# Patient Record
Sex: MALE | Race: WHITE | NOT HISPANIC OR LATINO | Employment: OTHER | URBAN - METROPOLITAN AREA
[De-identification: names, ages, dates, MRNs, and addresses within clinical notes are randomized per-mention and may not be internally consistent; named-entity substitution may affect disease eponyms.]

---

## 2019-05-09 ENCOUNTER — HOSPITAL ENCOUNTER (OUTPATIENT)
Dept: GENERAL RADIOLOGY | Facility: HOSPITAL | Age: 64
Discharge: HOME | End: 2019-05-09
Attending: PHYSICIAN ASSISTANT

## 2019-05-10 ENCOUNTER — OFFICE VISIT CONVERTED (OUTPATIENT)
Dept: NEUROSURGERY | Facility: CLINIC | Age: 64
End: 2019-05-10
Attending: PHYSICIAN ASSISTANT

## 2019-06-06 ENCOUNTER — OFFICE VISIT CONVERTED (OUTPATIENT)
Dept: NEUROSURGERY | Facility: CLINIC | Age: 64
End: 2019-06-06
Attending: PHYSICIAN ASSISTANT

## 2019-07-03 ENCOUNTER — OFFICE VISIT CONVERTED (OUTPATIENT)
Dept: NEUROSURGERY | Facility: CLINIC | Age: 64
End: 2019-07-03
Attending: PHYSICIAN ASSISTANT

## 2019-08-05 ENCOUNTER — OFFICE VISIT CONVERTED (OUTPATIENT)
Dept: NEUROSURGERY | Facility: CLINIC | Age: 64
End: 2019-08-05
Attending: PHYSICIAN ASSISTANT

## 2019-09-03 ENCOUNTER — HOSPITAL ENCOUNTER (OUTPATIENT)
Dept: PHYSICAL THERAPY | Facility: CLINIC | Age: 64
Setting detail: RECURRING SERIES
Discharge: HOME | End: 2019-12-09
Attending: NEUROLOGICAL SURGERY

## 2021-05-15 VITALS
BODY MASS INDEX: 37.81 KG/M2 | DIASTOLIC BLOOD PRESSURE: 62 MMHG | HEIGHT: 70 IN | OXYGEN SATURATION: 97 % | WEIGHT: 264.12 LBS | HEART RATE: 66 BPM | SYSTOLIC BLOOD PRESSURE: 128 MMHG

## 2021-05-15 VITALS
BODY MASS INDEX: 37.43 KG/M2 | DIASTOLIC BLOOD PRESSURE: 82 MMHG | WEIGHT: 261.44 LBS | OXYGEN SATURATION: 97 % | SYSTOLIC BLOOD PRESSURE: 130 MMHG | HEIGHT: 70 IN | HEART RATE: 72 BPM

## 2021-05-15 VITALS — BODY MASS INDEX: 39.22 KG/M2 | HEART RATE: 74 BPM | HEIGHT: 70 IN | WEIGHT: 274 LBS

## 2021-05-15 VITALS — HEIGHT: 70 IN | BODY MASS INDEX: 38.22 KG/M2 | HEART RATE: 71 BPM | WEIGHT: 267 LBS

## 2021-12-13 ENCOUNTER — OFFICE VISIT (OUTPATIENT)
Dept: ORTHOPEDIC SURGERY | Facility: CLINIC | Age: 66
End: 2021-12-13

## 2021-12-13 VITALS — HEIGHT: 69 IN | BODY MASS INDEX: 40.73 KG/M2 | WEIGHT: 275 LBS

## 2021-12-13 DIAGNOSIS — M25.561 RIGHT KNEE PAIN, UNSPECIFIED CHRONICITY: Primary | ICD-10-CM

## 2021-12-13 DIAGNOSIS — M17.11 PRIMARY OSTEOARTHRITIS OF RIGHT KNEE: ICD-10-CM

## 2021-12-13 PROCEDURE — 20610 DRAIN/INJ JOINT/BURSA W/O US: CPT | Performed by: PHYSICIAN ASSISTANT

## 2021-12-13 PROCEDURE — 99203 OFFICE O/P NEW LOW 30 MIN: CPT | Performed by: PHYSICIAN ASSISTANT

## 2021-12-13 RX ORDER — VALSARTAN AND HYDROCHLOROTHIAZIDE 320; 25 MG/1; MG/1
1 TABLET, FILM COATED ORAL DAILY
COMMUNITY
Start: 2021-09-22

## 2021-12-13 RX ORDER — LIDOCAINE HYDROCHLORIDE 10 MG/ML
9 INJECTION, SOLUTION INFILTRATION; PERINEURAL
Status: COMPLETED | OUTPATIENT
Start: 2021-12-13 | End: 2021-12-13

## 2021-12-13 RX ORDER — TRIAMCINOLONE ACETONIDE 40 MG/ML
40 INJECTION, SUSPENSION INTRA-ARTICULAR; INTRAMUSCULAR
Status: COMPLETED | OUTPATIENT
Start: 2021-12-13 | End: 2021-12-13

## 2021-12-13 RX ADMIN — TRIAMCINOLONE ACETONIDE 40 MG: 40 INJECTION, SUSPENSION INTRA-ARTICULAR; INTRAMUSCULAR at 09:07

## 2021-12-13 RX ADMIN — LIDOCAINE HYDROCHLORIDE 9 ML: 10 INJECTION, SOLUTION INFILTRATION; PERINEURAL at 09:07

## 2021-12-13 NOTE — PROGRESS NOTES
"Chief Complaint  Pain of the Right Knee    Subjective          Steve Roa presents to [facility] for evaluation of right knee pain. Patient states that on Thanksgiving day his grandson had a toy under his recliner. He states when he got back up onto his feet, he felt a slight \"pinch\" and felt that he was unable to move his knee around. He states that he had swelling following this. He states he could not walk on his knee about a week later. He does report that his pain has improved over the past several days. He has tried taking Aleeve for his knee. He has history of right knee arthroscopy by Dr. Milton in 2013. He presents today using a cane for ambulation assistance, which he states he has been using since last Monday.     Objective   Allergies   Allergen Reactions   • Diclofenac Sodium Unknown - Low Severity     DOES NOT TOLERATE WELL       Vital Signs:   Ht 175.3 cm (69\")   Wt 125 kg (275 lb)   BMI 40.61 kg/m²       Physical Exam  Constitutional:       Appearance: Normal appearance. Patient is well-developed and normal weight.   HENT:      Head: Normocephalic.      Right Ear: Hearing and external ear normal.      Left Ear: Hearing and external ear normal.      Nose: Nose normal.   Eyes:      Conjunctiva/sclera: Conjunctivae normal.   Cardiovascular:      Rate and Rhythm: Normal rate.   Pulmonary:      Effort: Pulmonary effort is normal.      Breath sounds: No wheezing or rales.   Abdominal:      Palpations: Abdomen is soft.      Tenderness: There is no abdominal tenderness.   Musculoskeletal:      Cervical back: Normal range of motion.   Skin:     Findings: No rash.   Neurological:      Mental Status: Patient is alert and oriented to person, place, and time.   Psychiatric:         Mood and Affect: Mood and affect normal.         Judgment: Judgment normal.     Ortho Exam  Right knee: Moderate swelling.  Exquisite tenderness to palpate along medial joint line.  Mildly tender " lateral joint line.  Tender hamstring tendons.  No discoloration or deformity.  AROM is -3 to 105 degrees of flexion.  Painful knee flexion.  Stable to varus/valgus stress.  Good muscle tone of quadriceps, hamstrings and ankle flexors.  Calf is soft and nontender.  Sensation intact, neurovascular intact.  Posterior tibialis pulse 2+.    Result Review :   The following data was reviewed by: TRACY Irwin on 12/13/2021:         Imaging Results (Most Recent)     Procedure Component Value Units Date/Time    XR Knee 3 View Right [[id number]] Resulted: 12/13/21 0854     Updated: 12/13/21 0859    Narrative:      X-Ray Report:  Study: X-rays ordered, taken in the office, and reviewed today  Site: right knee Xray  Indication: right knee pain   View: Lateral, Standing and Sunrise view(s)  Findings: Moderate-advanced degenerative changes.   Prior studies available for comparison: no         Large Joint Arthrocentesis: R knee  Date/Time: 12/13/2021 9:07 AM  Consent given by: patient  Site marked: site marked  Timeout: Immediately prior to procedure a time out was called to verify the correct patient, procedure, equipment, support staff and site/side marked as required   Supporting Documentation  Indications: pain   Procedure Details  Location: knee - R knee  Medications administered: 9 mL lidocaine 1 %; 40 mg triamcinolone acetonide 40 MG/ML  Patient tolerance: patient tolerated the procedure well with no immediate complications            Assessment and Plan    Problem List Items Addressed This Visit        Musculoskeletal and Injuries    Right knee pain - Primary    Relevant Orders    XR Knee 3 View Right (Completed)    Primary osteoarthritis of right knee        Discussed diagnosis and treatment options with patient today in clinic.  We discussed anti-inflammatory medication, physical therapy and steroid injections.  Patient was given steroid injection today.  He will continue use of walking cane until symptoms improve.   We discussed in the future seeking approval for Synvisc injections.  Patient was informed that he is a candidate for right total knee arthroplasty, however he would like to continue with conservative management if possible.      Follow Up   Return in about 4 weeks (around 1/10/2022).  Patient Instructions   Recommend ice for any pain and swelling following today steroid injection.  Patient will follow up in 4 weeks to reevaluate improvement of symptoms.    Patient was given instructions and counseling regarding his condition or for health maintenance advice. Please see specific information pulled into the AVS if appropriate.

## 2021-12-13 NOTE — PATIENT INSTRUCTIONS
Recommend ice for any pain and swelling following today steroid injection.  Patient will follow up in 4 weeks to reevaluate improvement of symptoms.

## 2021-12-16 ENCOUNTER — PATIENT ROUNDING (BHMG ONLY) (OUTPATIENT)
Dept: ORTHOPEDIC SURGERY | Facility: CLINIC | Age: 66
End: 2021-12-16

## 2021-12-16 NOTE — PROGRESS NOTES
December 16, 2021    Hello, may I speak with Steve Roa?    My name is Merry      I am  with [facility]  [address]  [place] [address] [phone].    Before we get started may I verify your date of birth? 1955    I am calling to officially welcome you to our practice and ask about your recent visit. Is this a good time to talk? yes    Tell me about your visit with us. What things went well?  All of it went well, provider had good interaction with patient.        We're always looking for ways to make our patients' experiences even better. Do you have recommendations on ways we may improve?  no    Overall were you satisfied with your first visit to our practice? yes       I appreciate you taking the time to speak with me today. Is there anything else I can do for you? no      Thank you, and have a great day.

## 2022-01-10 ENCOUNTER — OFFICE VISIT (OUTPATIENT)
Dept: ORTHOPEDIC SURGERY | Facility: CLINIC | Age: 67
End: 2022-01-10

## 2022-01-10 VITALS — HEIGHT: 69 IN | BODY MASS INDEX: 39.99 KG/M2 | WEIGHT: 270 LBS

## 2022-01-10 DIAGNOSIS — M17.11 PRIMARY OSTEOARTHRITIS OF RIGHT KNEE: Primary | ICD-10-CM

## 2022-01-10 PROCEDURE — 99212 OFFICE O/P EST SF 10 MIN: CPT | Performed by: PHYSICIAN ASSISTANT

## 2022-01-10 NOTE — PATIENT INSTRUCTIONS
We will seek approval for visco. If he decides to not pursue Synvisc injection, he can repeat steroid injection 03/13/22.     Call or return with any changes or concerns.

## 2022-01-10 NOTE — PROGRESS NOTES
"Chief Complaint  Pain of the Right Knee    Subjective          Steve Roa presents to [facility] for follow-up of right knee pain, right knee osteoarthritis.  Patient presented to clinic approximately 1 month ago, at which time he was having pain of his medial aspect of knee.  He has history of right knee arthroscopy by Dr. Milton in 2013.  Patient opted to have steroid injection at previous visit.  He also has been using Aleve, which he states provides him more relief than Tylenol.  He states steroid injection helped with pain of his right knee.  He states he has no longer needed to use cane for ambulation assistance and presents fully weightbearing today.    Objective   Allergies   Allergen Reactions   • Diclofenac Sodium Unknown - Low Severity     DOES NOT TOLERATE WELL       Vital Signs:   Ht 175.3 cm (69\")   Wt 122 kg (270 lb)   BMI 39.87 kg/m²       Physical Exam  Constitutional:       Appearance: Normal appearance. Patient is well-developed and normal weight.   HENT:      Head: Normocephalic.      Right Ear: Hearing and external ear normal.      Left Ear: Hearing and external ear normal.      Nose: Nose normal.   Eyes:      Conjunctiva/sclera: Conjunctivae normal.   Cardiovascular:      Rate and Rhythm: Normal rate.   Pulmonary:      Effort: Pulmonary effort is normal.      Breath sounds: No wheezing or rales.   Abdominal:      Palpations: Abdomen is soft.      Tenderness: There is no abdominal tenderness.   Musculoskeletal:      Cervical back: Normal range of motion.   Skin:     Findings: No rash.   Neurological:      Mental Status: Patient is alert and oriented to person, place, and time.   Psychiatric:         Mood and Affect: Mood and affect normal.         Judgment: Judgment normal.     Ortho Exam  Right knee: Full knee flexion and extension.  Crepitus with knee extension.  Mildly tender over the medial joint line.  Normal swelling.  Stable to varus/valgus stress.  " Good muscle tone of the quadriceps, hamstrings and ankle flexors.  Full plantar and dorsiflexion of the ankle.  Sensation intact, neurovascular intact, posterior tibialis pulse 2+, calf is soft and nontender.  Fully weightbearing, gait nonantalgic.    Result Review :   The following data was reviewed by: TRACY Irwin on 01/10/2022:         Imaging Results (Most Recent)     None                Assessment and Plan    Problem List Items Addressed This Visit        Musculoskeletal and Injuries    Primary osteoarthritis of right knee - Primary        Discussed treatment options with patient in clinic.  Patient is candidate for right total knee arthroplasty.  Steroid injection help relieve knee pain.  We discussed trying Synvisc injection and we will seek approval of this.  If he decides not to pursue Synvisc injection he can repeat steroid injection in March.      Follow Up   Return for Recheck.  Patient Instructions   We will seek approval for visco. If he decides to not pursue Synvisc injection, he can repeat steroid injection 03/13/22.     Call or return with any changes or concerns.     Patient was given instructions and counseling regarding his condition or for health maintenance advice. Please see specific information pulled into the AVS if appropriate.

## 2022-01-19 ENCOUNTER — IMMUNIZATION (OUTPATIENT)
Dept: VACCINE CLINIC | Facility: HOSPITAL | Age: 67
End: 2022-01-19

## 2022-01-19 PROCEDURE — 91300 HC SARSCOV02 VAC 30MCG/0.3ML IM: CPT | Performed by: INTERNAL MEDICINE

## 2022-01-19 PROCEDURE — 0004A HC ADM SARSCOV2 30MCG/0.3ML BOOSTER: CPT | Performed by: INTERNAL MEDICINE

## 2022-02-09 ENCOUNTER — OFFICE VISIT (OUTPATIENT)
Dept: ORTHOPEDIC SURGERY | Facility: CLINIC | Age: 67
End: 2022-02-09

## 2022-02-09 VITALS — BODY MASS INDEX: 39.99 KG/M2 | HEART RATE: 72 BPM | WEIGHT: 270 LBS | OXYGEN SATURATION: 95 % | HEIGHT: 69 IN

## 2022-02-09 DIAGNOSIS — M17.11 PRIMARY OSTEOARTHRITIS OF RIGHT KNEE: Primary | ICD-10-CM

## 2022-02-09 PROCEDURE — 20610 DRAIN/INJ JOINT/BURSA W/O US: CPT | Performed by: PHYSICIAN ASSISTANT

## 2022-02-09 NOTE — PROGRESS NOTES
"Chief Complaint  Follow-up of the Right Knee    Subjective          Steve Roa presents to [facility] for follow up of right knee pain, right knee osteoarthritis. Patient has history of right knee arthroscopy by Dr. Milton in 2013. He has been having medial side knee pain over the past several months. He received steroid injection of his knee in December with good relief, but states shot did not help for as long as he would have liked. He is candidate for right TKA. He is here today to receive Synvisc injection of his knee. He wishes to purse conservative management of right knee osteoarthritis at this time.    Objective   Allergies   Allergen Reactions   • Diclofenac Sodium Unknown - Low Severity     DOES NOT TOLERATE WELL       Vital Signs:   Pulse 72   Ht 175.3 cm (69\")   Wt 122 kg (270 lb)   SpO2 95%   BMI 39.87 kg/m²       Physical Exam  Constitutional:       Appearance: Normal appearance. Patient is well-developed and normal weight.   HENT:      Head: Normocephalic.      Right Ear: Hearing and external ear normal.      Left Ear: Hearing and external ear normal.      Nose: Nose normal.   Eyes:      Conjunctiva/sclera: Conjunctivae normal.   Cardiovascular:      Rate and Rhythm: Normal rate.   Pulmonary:      Effort: Pulmonary effort is normal.      Breath sounds: No wheezing or rales.   Abdominal:      Palpations: Abdomen is soft.      Tenderness: There is no abdominal tenderness.   Musculoskeletal:      Cervical back: Normal range of motion.   Skin:     Findings: No rash.   Neurological:      Mental Status: Patient is alert and oriented to person, place, and time.   Psychiatric:         Mood and Affect: Mood and affect normal.         Judgment: Judgment normal.     Ortho Exam  RIGHT KNEE: tender medial joint line. Scars present. Trace effusion. Full knee extension, flexion to 115. Good strength to the quadriceps and hamstrings. Sensation intact. Neurovascular intact. Calf " soft, non-tender. Posterior tibialis pulse 2+.     Result Review :            Imaging Results (Most Recent)     None         Large Joint Arthrocentesis: R knee  Date/Time: 2/9/2022 9:25 AM  Consent given by: patient  Site marked: site marked  Timeout: Immediately prior to procedure a time out was called to verify the correct patient, procedure, equipment, support staff and site/side marked as required   Supporting Documentation  Indications: pain   Procedure Details  Location: knee - R knee  Needle gauge: 21g.  Medications administered: 48 mg hylan 48 MG/6ML  Patient tolerance: patient tolerated the procedure well with no immediate complications            Assessment and Plan    Problem List Items Addressed This Visit        Musculoskeletal and Injuries    Primary osteoarthritis of right knee - Primary          Follow Up   Return if symptoms worsen or fail to improve.  Patient Instructions   Right knee Synvisc injection given today. Recommend rest, ice and elevation of the right knee following today's injection.   Call or return with any changes or concerns.   Follow up as needed.    Patient was given instructions and counseling regarding his condition or for health maintenance advice. Please see specific information pulled into the AVS if appropriate.

## 2022-02-09 NOTE — PATIENT INSTRUCTIONS
Right knee Synvisc injection given today. Recommend rest, ice and elevation of the right knee following today's injection.   Call or return with any changes or concerns.   Follow up as needed.

## 2022-05-19 ENCOUNTER — TELEPHONE (OUTPATIENT)
Dept: ORTHOPEDIC SURGERY | Facility: CLINIC | Age: 67
End: 2022-05-19

## 2022-05-19 NOTE — TELEPHONE ENCOUNTER
Caller: CHRISTIANO HINES    Relationship to patient: SELF    Best call back number: [phone]    Chief complaint: RT KNEE    Type of visit: INJECTION/FOLLOW UP    Requested date: PT WOULD LIKE TO GET BEFORE NEXT WEEKEND IF POSSIBLE.    If rescheduling, when is the original appointment: NA    Additional notes: WOULD PREFER GEL INJECTION. IF IT IS TOO SOON FOR THAT WOULD LIKE TO HAVE STEROID INJECTION.

## 2022-05-20 ENCOUNTER — OFFICE VISIT (OUTPATIENT)
Dept: ORTHOPEDIC SURGERY | Facility: CLINIC | Age: 67
End: 2022-05-20

## 2022-05-20 VITALS — HEIGHT: 69 IN | WEIGHT: 265 LBS | BODY MASS INDEX: 39.25 KG/M2

## 2022-05-20 DIAGNOSIS — M17.11 PRIMARY OSTEOARTHRITIS OF RIGHT KNEE: Primary | ICD-10-CM

## 2022-05-20 PROCEDURE — 20610 DRAIN/INJ JOINT/BURSA W/O US: CPT | Performed by: PHYSICIAN ASSISTANT

## 2022-05-20 RX ORDER — LIDOCAINE HYDROCHLORIDE 10 MG/ML
5 INJECTION, SOLUTION INFILTRATION; PERINEURAL
Status: COMPLETED | OUTPATIENT
Start: 2022-05-20 | End: 2022-05-20

## 2022-05-20 RX ORDER — TRIAMCINOLONE ACETONIDE 40 MG/ML
40 INJECTION, SUSPENSION INTRA-ARTICULAR; INTRAMUSCULAR
Status: COMPLETED | OUTPATIENT
Start: 2022-05-20 | End: 2022-05-20

## 2022-05-20 RX ADMIN — TRIAMCINOLONE ACETONIDE 40 MG: 40 INJECTION, SUSPENSION INTRA-ARTICULAR; INTRAMUSCULAR at 08:45

## 2022-05-20 RX ADMIN — LIDOCAINE HYDROCHLORIDE 5 ML: 10 INJECTION, SOLUTION INFILTRATION; PERINEURAL at 08:45

## 2022-05-20 NOTE — PROGRESS NOTES
"Chief Complaint  Follow-up of the Right Knee    Subjective          Steve Roa presents to [facility] for follow-up of right knee pain, right knee osteoarthritis.  Patient had Synvisc injection of his knee on 02/09/2022 and reports great relief following this.  He states as of yesterday, he began having knee pain once again.  He does have history of right knee scope by Dr. Milton in 2013.  He is also candidate for right total knee arthroplasty, but wishes to continue conservative care as it is working for him.  He is requesting steroid injection today.    Objective   Allergies   Allergen Reactions   • Diclofenac Sodium Unknown - Low Severity     DOES NOT TOLERATE WELL       Vital Signs:   Ht 175.3 cm (69\")   Wt 120 kg (265 lb)   BMI 39.13 kg/m²       Physical Exam  Constitutional:       Appearance: Normal appearance. Patient is well-developed and normal weight.   HENT:      Head: Normocephalic.      Right Ear: Hearing and external ear normal.      Left Ear: Hearing and external ear normal.      Nose: Nose normal.   Eyes:      Conjunctiva/sclera: Conjunctivae normal.   Cardiovascular:      Rate and Rhythm: Normal rate.   Pulmonary:      Effort: Pulmonary effort is normal.      Breath sounds: No wheezing or rales.   Abdominal:      Palpations: Abdomen is soft.      Tenderness: There is no abdominal tenderness.   Musculoskeletal:      Cervical back: Normal range of motion.   Skin:     Findings: No rash.   Neurological:      Mental Status: Patient is alert and oriented to person, place, and time.   Psychiatric:         Mood and Affect: Mood and affect normal.         Judgment: Judgment normal.     Ortho Exam  Right knee: Scars present and well-healed.  Tenderness to the medial joint line.  Mild to moderate swelling.  AROM is 0 to 115 degrees of flexion.  Stable to varus/valgus stress.  Full plantarflexion and dorsiflexion of the ankle.  Over the toes.  Full weightbearing, nonantalgic " gait.  Sensation and pulses are intact.  Neurovascular intact distally.    Result Review :   The following data was reviewed by: TRACY Irwin on 05/20/2022:         Imaging Results (Most Recent)     None         Large Joint Arthrocentesis: R knee  Date/Time: 5/20/2022 8:45 AM  Consent given by: patient  Site marked: site marked  Timeout: Immediately prior to procedure a time out was called to verify the correct patient, procedure, equipment, support staff and site/side marked as required   Supporting Documentation  Indications: pain   Procedure Details  Location: knee - R knee  Needle gauge: 21g.  Medications administered: 5 mL lidocaine 1 %; 40 mg triamcinolone acetonide 40 MG/ML  Patient tolerance: patient tolerated the procedure well with no immediate complications            Assessment and Plan    Problem List Items Addressed This Visit        Musculoskeletal and Injuries    Primary osteoarthritis of right knee - Primary          Follow Up   No follow-ups on file.  Patient Instructions   Patient received steroid injection of the right knee today. He had great relief from Synvisc injection in February. Discussed repeating this in August.     Follow up PRN.     Patient was given instructions and counseling regarding his condition or for health maintenance advice. Please see specific information pulled into the AVS if appropriate.

## 2022-05-20 NOTE — PATIENT INSTRUCTIONS
Patient received steroid injection of the right knee today. He had great relief from Synvisc injection in February. Discussed repeating this in August.     Follow up PRN.

## 2022-08-16 ENCOUNTER — TRANSCRIBE ORDERS (OUTPATIENT)
Dept: ADMINISTRATIVE | Facility: HOSPITAL | Age: 67
End: 2022-08-16

## 2022-08-16 DIAGNOSIS — M54.50 LOW BACK PAIN, UNSPECIFIED BACK PAIN LATERALITY, UNSPECIFIED CHRONICITY, UNSPECIFIED WHETHER SCIATICA PRESENT: Primary | ICD-10-CM

## 2022-08-25 ENCOUNTER — HOSPITAL ENCOUNTER (OUTPATIENT)
Dept: MRI IMAGING | Facility: HOSPITAL | Age: 67
Discharge: HOME | End: 2022-08-25
Admitting: PHYSICIAN ASSISTANT

## 2022-08-25 DIAGNOSIS — M54.50 LOW BACK PAIN, UNSPECIFIED BACK PAIN LATERALITY, UNSPECIFIED CHRONICITY, UNSPECIFIED WHETHER SCIATICA PRESENT: ICD-10-CM

## 2022-08-25 PROCEDURE — 72148 MRI LUMBAR SPINE W/O DYE: CPT

## 2022-09-28 ENCOUNTER — OFFICE VISIT (OUTPATIENT)
Dept: NEUROSURGERY | Facility: CLINIC | Age: 67
End: 2022-09-28

## 2022-09-28 VITALS
WEIGHT: 287.3 LBS | BODY MASS INDEX: 42.55 KG/M2 | DIASTOLIC BLOOD PRESSURE: 76 MMHG | SYSTOLIC BLOOD PRESSURE: 128 MMHG | HEIGHT: 69 IN

## 2022-09-28 DIAGNOSIS — R20.2 PARESTHESIAS: ICD-10-CM

## 2022-09-28 DIAGNOSIS — M48.061 SPINAL STENOSIS, LUMBAR REGION, WITHOUT NEUROGENIC CLAUDICATION: ICD-10-CM

## 2022-09-28 DIAGNOSIS — M47.817 SPONDYLOSIS OF LUMBOSACRAL REGION WITHOUT MYELOPATHY OR RADICULOPATHY: Primary | ICD-10-CM

## 2022-09-28 DIAGNOSIS — M43.10 PARS DEFECT WITH SPONDYLOLISTHESIS: ICD-10-CM

## 2022-09-28 DIAGNOSIS — S32.041S CLOSED STABLE BURST FRACTURE OF FOURTH LUMBAR VERTEBRA, SEQUELA: ICD-10-CM

## 2022-09-28 DIAGNOSIS — M43.00 PARS DEFECT WITH SPONDYLOLISTHESIS: ICD-10-CM

## 2022-09-28 PROCEDURE — 99204 OFFICE O/P NEW MOD 45 MIN: CPT | Performed by: NURSE PRACTITIONER

## 2022-09-28 NOTE — PROGRESS NOTES
"Chief Complaint  Back Pain    Subjective          Steve Roa who is a 66 y.o. year old male who presents to [facility] for evaluation of lumbar spine.      Pt with history of L4 burst fracture three ago following a 14 foot fall of a barn roof. He has had back pain since that time which is worsening. The patient complains of pain located in the lumbar spine.  Patients states the pain has been present for 1 year.  The pain came on gradually.  The pain scale level is 2. Will increase to severe with activity.  Pain is primarily in the low back. The pain does not radiate. .  The pain is waxing/waning and described as dull and aching.  The pain is worse at no particular time of day. Patient states prolonged standing, prolonged walking, bending and twisting makes the pain worse.  Patient states rest makes the pain better.    Associated Symptoms Include: Numbness and Tingling. He has constant numbness and tingling in both feet, the whole foot to the ankle. He states that his toes feel like ice all the time.   Conservative Interventions Include: NSAIDs that were somewhat effective. He will take Aleve. He was prescribed Diclofenac though unsure this provides significant benefit. Tylenol did not help. He has not had any type of physical therapy. He has never seen pain management.     Was this the result of an injury or accident?: No    History of Previous Spinal Surgery?: No    Nicotine use: non-smoker    BMI: Body mass index is 42.43 kg/m².      Review of Systems   Musculoskeletal: Positive for arthralgias, back pain and myalgias.   Neurological: Positive for numbness.   All other systems reviewed and are negative.       Objective   Vital Signs:   /76 (BP Location: Left arm, Patient Position: Sitting)   Ht 175.3 cm (69\")   Wt 130 kg (287 lb 4.8 oz)   BMI 42.43 kg/m²       Physical Exam  Vitals reviewed.   Constitutional:       Appearance: Normal appearance. "   Musculoskeletal:      Lumbar back: Tenderness present. Negative right straight leg raise test and negative left straight leg raise test.      Right hip: No tenderness. Normal range of motion.      Left hip: No tenderness. Normal range of motion.   Neurological:      Mental Status: He is alert and oriented to person, place, and time.      Gait: Gait is intact.      Deep Tendon Reflexes: Strength normal.      Reflex Scores:       Patellar reflexes are 0 on the right side and 0 on the left side.       Achilles reflexes are 0 on the right side and 0 on the left side.       Neurologic Exam     Mental Status   Oriented to person, place, and time.   Level of consciousness: alert    Motor Exam   Muscle bulk: normal  Overall muscle tone: normal    Strength   Strength 5/5 throughout.     Sensory Exam   Right leg light touch: decreased from ankle  Left leg light touch: decreased from ankle    Gait, Coordination, and Reflexes     Gait  Gait: normal    Reflexes   Right patellar: 0  Left patellar: 0  Right achilles: 0  Left achilles: 0  Right ankle clonus: absent  Left ankle clonus: absent       Result Review :       Data reviewed: Radiologic studies MRI Lumbar Spine on 8/25/22 at [facility] personally reviewed. Old healed L4 compression fracture. Stable grade 1 anterolisthesis of L5 on S1 secondary to facet arthritis and probable chronic left sided pars defect. Varying degrees of canal or foraminal stenosis. At L3/4 there is moderately severe central canal stenosis. At L4/5 there is moderate canal stenosis and moderately severe right greater than left foraminal narrowing. Findings are similar to prior imaging.            Assessment and Plan    Diagnoses and all orders for this visit:    1. Spondylosis of lumbosacral region without myelopathy or radiculopathy (Primary)  -     Ambulatory Referral to Physical Therapy Evaluate and treat; Heat; Moist heat; Cross Fiber; Stretching (Traction), ROM, Strengthening  -     Ambulatory Referral  to Pain Management    2. Closed stable burst fracture of fourth lumbar vertebra, sequela  -     Ambulatory Referral to Physical Therapy Evaluate and treat; Heat; Moist heat; Cross Fiber; Stretching (Traction), ROM, Strengthening  -     Ambulatory Referral to Pain Management    3. Pars defect with spondylolisthesis    4. Spinal stenosis, lumbar region, without neurogenic claudication    5. Paresthesias  -     EMG & Nerve Conduction Test; Future    Pt with history of L4 burst fracture, presenting for evaluation of worsening low back pain. We reviewed his MRI Lumbar Spine, demonstrating multilevel spondylosis with chronic burst fracture at L4, resulting in varying levels of canal and foraminal stenosis. His pain is primarily axial. We discussed that surgery would not improve his pain.     We discussed the importance of core strengthening, avoidance of activities that worsen the pain, nicotine cessation and maintenance of healthy weight. Surgery for patients with multilevel degenerative disc disease is not typically successful with the risks outweighing the benefit.     Will refer to physical therapy for traction, E-stim, massage, range of motion. Refer to pain management to discuss lumbar facet joint injections and rhizotomy.     He is having numbness and paresthesias in the feet, in a symmetric distribution. Suspect a possible peripheral polyneuropathy. Will proceed with EMG/NCV for evaluation.     He will follow up as needed.     I spent 45 minutes caring for Steve on this date of service. This time includes time spent by me in the following activities:preparing for the visit, reviewing tests, obtaining and/or reviewing a separately obtained history, performing a medically appropriate examination and/or evaluation , counseling and educating the patient/family/caregiver, ordering medications, tests, or procedures, referring and communicating with other health care professionals , documenting information in the  medical record and independently interpreting results and communicating that information with the patient/family/caregiver.    Follow Up   Return if symptoms worsen or fail to improve.  Patient was given instructions and counseling regarding his condition or for health maintenance advice.     -Referral to pain management for facet joint injections  -Physical therapy for traction, strengthening, range of motion  -EMG/NCV  -Follow up as needed

## 2022-09-28 NOTE — PATIENT INSTRUCTIONS
-Referral to pain management for facet joint injections  -Physical therapy for traction, strengthening, range of motion  -EMG/NCV of the lower extremities  -Follow up as needed

## 2022-10-10 ENCOUNTER — TREATMENT (OUTPATIENT)
Dept: PHYSICAL THERAPY | Facility: CLINIC | Age: 67
End: 2022-10-10

## 2022-10-10 DIAGNOSIS — M25.60 STIFFNESS IN JOINT: ICD-10-CM

## 2022-10-10 DIAGNOSIS — S32.041D: ICD-10-CM

## 2022-10-10 DIAGNOSIS — M47.816 SPONDYLOSIS OF LUMBAR REGION WITHOUT MYELOPATHY OR RADICULOPATHY: Primary | ICD-10-CM

## 2022-10-10 DIAGNOSIS — M43.17 SPONDYLOLISTHESIS AT L5-S1 LEVEL: ICD-10-CM

## 2022-10-10 PROCEDURE — 97161 PT EVAL LOW COMPLEX 20 MIN: CPT | Performed by: PHYSICAL THERAPIST

## 2022-10-10 PROCEDURE — 97110 THERAPEUTIC EXERCISES: CPT | Performed by: PHYSICAL THERAPIST

## 2022-10-10 NOTE — PROGRESS NOTES
Physical Therapy Initial Evaluation and Plan of Care    Patient: Steve Roa   : 1955  Diagnosis/ICD-10 Code:  Spondylosis of lumbar region without myelopathy or radiculopathy [M47.816]  Referring practitioner: Latesha Ivory*  Date of Initial Visit: 10/10/2022  Today's Date: 10/10/2022  Patient seen for 1 sessions           Subjective Questionnaire: Oswestry: 16/50 = 32% Disability      Subjective Evaluation    History of Present Illness  Mechanism of injury: The patient presents to physical therapy with complaints of low back pain and bilateral foot numbness (left worse than right). He has experienced pain intermittently since he fell 14 feet from the roof of a barn in 2019 and sustained a stable L4 burst fracture. His back has hurt intermittently since that fall. His pain has gradually worsened over time, specifically the past few months. His pain is located primarily across his low back. His pain is increased with static standing, pushing a lawnmower, and doing the dishes. His pain is improved with laying down, sitting, and using Aspirin/Aleve. The numbness in his feet is nearly constant.     MRI Results:  1. Stable old healed L4 compression fracture.   2. Stable grade 1 anterior spondylolisthesis of L5 on S1 secondary to a combination of facet   arthritis and suggestion of a chronic left-sided pars defect.   3. Degenerative changes.   4. Varying degrees of canal stenosis and neural foraminal narrowing similar to the outside study.       Patient Occupation: Retired Pain  Current pain rating: 3 (Low back, pain is 5-6/10 a little higher on the right)  At best pain ratin  At worst pain ratin    Diagnostic Tests  MRI studies: abnormal    Patient Goals  Patient goal: The patient would like to have less pain be able to return to some light  activity.           Objective          Tenderness     Additional Tenderness Details  Tenderness to palpation in right sided lumbar  paraspinals and illiopsoas    Neurological Testing     Additional Neurological Details  Sensation to light touch intact and equal bilaterally from L2-S1 except for hyposensation on left in L4 and L5 dermatome.    Seated slump: (+) on left for increased tension in sciatic nerve.    Supine passive SLR: (-) bilaterally for increased sciatic neural tension.    Active Range of Motion     Lumbar   Flexion: 65 degrees   Extension: 5 degrees with pain  Left lateral flexion: Active left lumbar lateral flexion: mid thigh, pain on right. with pain  Right lateral flexion: Active right lumbar lateral flexion: 2'' above knee joint line.   Left rotation: Active left lumbar rotation: 50%, pain on right. with pain  Right rotation: Active right lumbar rotation: 75%     Strength/Myotome Testing     Left Hip   Planes of Motion   Flexion: 4-  Extension: 4-  Abduction: 4    Right Hip   Planes of Motion   Flexion: 4  Extension: 4-  Abduction: 4-    Left Knee   Flexion: 4  Extension: 5    Right Knee   Flexion: 4+  Extension: 5    Left Ankle/Foot   Dorsiflexion: 5    Right Ankle/Foot   Dorsiflexion: 5    Ambulation     Ambulation: Level Surfaces     Additional Level Surfaces Ambulation Details  The patient ambulates with equal step length bilaterally, but with minimal trunk movement and slight right lateral shift of trunk.      See Exercise, Manual, and Modality Logs for complete treatment.     Assessment & Plan     Assessment  Impairments: abnormal gait, abnormal muscle firing, abnormal muscle tone, abnormal or restricted ROM, activity intolerance, impaired balance, impaired physical strength, lacks appropriate home exercise program, pain with function and safety issue  Functional Limitations: carrying objects, lifting, sleeping, walking, pulling, pushing, uncomfortable because of pain, moving in bed, sitting, standing, stooping and unable to perform repetitive tasks  Assessment details: The patient presents to physical therapy with  complaints of low back pain with associated bilateral feet numbness. He has a history of a stable burst fracture at L4 and recent MRI which revealed a grade I spondylolisthesis of L5-S1. He presents with associated lumbar stiffness, tenderness to palpation, lower extremity weakness, altered sensation, and functional deficits (MALLORY). He would benefit from skilled physical therapy as described below to address these limitations and allow the patient to return to his prior level of function.  Prognosis: good    Goals  Plan Goals: LOW BACK PROBLEMS:    1. The patient complains of low back pain.  LTG 1: 12 weeks:  The patient will report a pain rating of 1/10 or better in order to improve  tolerance to activities of daily living and improve sleep quality.  STATUS:  New  STG 1a: 6 weeks:  The patient will report a pain rating of 2/10 or better.  STATUS:  New  TREATMENT:  Therapeutic exercises, manual therapy, aquatic therapy, home exercise   instruction, and modalities as needed for pain to include:  electrical stimulation, moist heat, ice,   ultrasound, and diathermy.      2. The patient demonstrates weakness of the bilateral hips.  LTG 2: 12 weeks:  The patient will demonstrate 4+ /5 strength for bilateral hip flexion, abduction,  and extension in order to improve hip stability.  STATUS:  New  STG 2a: 6 weeks:  The patient will demonstrate 4 /5 strength for bilateral hip flexion, abduction,  and extension.  STATUS:  New  TREATMENT: Therapeutic exercises, manual therapy, aquatic therapy, home exercise instruction,  and modalities as needed for pain to include:  electrical stimulation, moist heat, ice, ultrasound, and   diathermy.      3. The patient has gait dysfunction.  LTG 3: 12 weeks:  The patient will ambulate without assistive device, independently, for   community distances with normal biomechanics in order to improve mobility and allow   patient to perform activities such as grocery shopping with greater  ease.  STATUS:  New  TREATMENT: Gait training, aquatic therapy, therapeutic exercise, and home exercise instruction.    4. The patient has limited lumbar AROM  LTG 4: 12 weeks:  The patient will demonstrate lumbar AROM as follows: 70 of flexion and 10 degrees of extension.  STATUS:  New  TREATMENT: Manual therapy, therapeutic exercise, home exercise instruction, and modalities as needed to include: moist heat, electrical stimulation, and ultrasound.      5. Mobility: Walking/Moving Around Functional Limitation    LTG 5: 12 weeks:  The patient will demonstrate 10 % limitation by achieving a score of 5/50 on the MALLORY.  STATUS:  New  STG 5 a: 6 weeks:  The patient will demonstrate 20 % limitation by achieving a score of 10/50 on the MALLORY.    STATUS:  New  TREATMENT:  Manual therapy, therapeutic exercise, home exercise instruction, and modalities as needed to include: moist heat, electrical stimulation, and ultrasound.    Plan  Therapy options: will be seen for skilled therapy services  Planned modality interventions: cryotherapy, electrical stimulation/Russian stimulation, TENS, dry needling and traction  Planned therapy interventions: balance/weight-bearing training, ADL retraining, soft tissue mobilization, strengthening, stretching, therapeutic activities, joint mobilization, home exercise program, functional ROM exercises, flexibility, body mechanics training, postural training, neuromuscular re-education, manual therapy, abdominal trunk stabilization, IADL retraining and spinal/joint mobilization  Frequency: 3x week  Duration in weeks: 12  Treatment plan discussed with: patient        Visit Diagnoses:    ICD-10-CM ICD-9-CM   1. Spondylosis of lumbar region without myelopathy or radiculopathy  M47.816 721.3   2. Stable burst fracture of fourth lumbar vertebra, subsequent encounter for fracture with routine healing  S32.041D V54.17   3. Stiffness in joint  M25.60 719.50       History # of Personal Factors and/or  Comorbidities: LOW (0)  Examination of Body System(s): # of elements: LOW (1-2)  Clinical Presentation: STABLE   Clinical Decision Making: LOW       Timed:         Manual Therapy:    0     mins  68404;     Therapeutic Exercise:    10     mins  69237;     Neuromuscular Magno:    0    mins  91783;    Therapeutic Activity:     0     mins  73816;     Gait Trainin     mins  77212;     Ultrasound:     0     mins  42308;    Ionto                               0    mins   91529  Self Care                       0     mins   40972  Canalith Repos    0     mins 17677      Un-Timed:  Electrical Stimulation:    0     mins  83460 ( );  Dry Needling     0     mins self-pay  Traction     0     mins 99973  Low Eval     30     Mins  49845  Mod Eval     0     Mins  84949  High Eval                       0     Mins  55576  Re-Eval                           0    mins  11197    Timed Treatment:   10   mins   Total Treatment:     40   mins    PT SIGNATURE: Elkin France PT    Electronically signed 10/10/2022    [place] License: PT - [id number]      Initial Certification  Certification Period: 10/10/2022 thru 2023  I certify that the therapy services are furnished while this patient is under my care.  The services outlined above are required by this patient, and will be reviewed every 90 days.     PHYSICIAN: Latesha Abbott APRN  NPI: [id number]      DATE:     Please sign and return via fax to [phone]. Thank you, [facility].

## 2022-10-12 ENCOUNTER — TREATMENT (OUTPATIENT)
Dept: PHYSICAL THERAPY | Facility: CLINIC | Age: 67
End: 2022-10-12

## 2022-10-12 DIAGNOSIS — M25.60 STIFFNESS IN JOINT: ICD-10-CM

## 2022-10-12 DIAGNOSIS — M43.17 SPONDYLOLISTHESIS AT L5-S1 LEVEL: ICD-10-CM

## 2022-10-12 DIAGNOSIS — S32.041D: ICD-10-CM

## 2022-10-12 DIAGNOSIS — M47.816 SPONDYLOSIS OF LUMBAR REGION WITHOUT MYELOPATHY OR RADICULOPATHY: Primary | ICD-10-CM

## 2022-10-12 PROCEDURE — 97110 THERAPEUTIC EXERCISES: CPT | Performed by: PHYSICAL THERAPIST

## 2022-10-12 PROCEDURE — 97530 THERAPEUTIC ACTIVITIES: CPT | Performed by: PHYSICAL THERAPIST

## 2022-10-12 NOTE — PROGRESS NOTES
Physical Therapy Daily Treatment Note    Patient: Steve Roa   : 1955  Diagnosis/ICD-10 Code:  Spondylosis of lumbar region without myelopathy or radiculopathy [M47.816]  Referring practitioner: Latesha Ivory*  Date of Initial Visit: Type: THERAPY  Noted: 10/10/2022  Today's Date: 10/12/2022  Patient seen for 2 sessions           Subjective   The patient reported that the pain in his upper back is a 3-4/10. His lower back pain is a 3/10 today. His HEP is going okay.    Objective   See Exercise, Manual, and Modality Logs for complete treatment.     Assessment/Plan  The patient demonstrated good tolerance to all functional hip/core strengthening exercise today. Continue to progress per patient tolerance.       Timed:  Manual Therapy:    0     mins  03826;  Therapeutic Exercise:    30     mins  53645;     Neuromuscular Magno:   0    mins  79373;    Therapeutic Activity:     8     mins  89754;     Gait Trainin     mins  61645;     Aquatics                         0      mins  61083    Un-timed:  Mechanical Traction      0     mins  46734  Dry Needling     0     mins self-pay  Electrical Stimulation:    0     mins  76745 ( );      Timed Treatment:   38   mins   Total Treatment:     38   mins    Elkin France PT  Physical Therapist    Electronically signed 10/12/2022    [place] License: PT - [id number]

## 2022-10-17 ENCOUNTER — TREATMENT (OUTPATIENT)
Dept: PHYSICAL THERAPY | Facility: CLINIC | Age: 67
End: 2022-10-17

## 2022-10-17 DIAGNOSIS — M43.17 SPONDYLOLISTHESIS AT L5-S1 LEVEL: ICD-10-CM

## 2022-10-17 DIAGNOSIS — S32.041D: ICD-10-CM

## 2022-10-17 DIAGNOSIS — M25.60 STIFFNESS IN JOINT: ICD-10-CM

## 2022-10-17 DIAGNOSIS — M47.816 SPONDYLOSIS OF LUMBAR REGION WITHOUT MYELOPATHY OR RADICULOPATHY: Primary | ICD-10-CM

## 2022-10-17 PROCEDURE — 97530 THERAPEUTIC ACTIVITIES: CPT | Performed by: PHYSICAL THERAPIST

## 2022-10-17 PROCEDURE — 97110 THERAPEUTIC EXERCISES: CPT | Performed by: PHYSICAL THERAPIST

## 2022-10-17 NOTE — PROGRESS NOTES
Physical Therapy Daily Treatment Note                      [place] [facility] [address] [facility] [place] [address]    Patient: Steve Roa   : 1955  Diagnosis/ICD-10 Code:  Spondylosis of lumbar region without myelopathy or radiculopathy [M47.816]  Referring practitioner: Latesha Ivory*  Date of Initial Visit: Type: THERAPY  Noted: 10/10/2022  Today's Date: 10/17/2022  Patient seen for 3 sessions           Subjective   The patient reported that he was sore after his last visit. Most of the pain was in his upper back.    Objective   See Exercise, Manual, and Modality Logs for complete treatment.     Assessment/Plan  The patient demonstrated good tolerance to all PT interventions today. Continue to progress hip/core strengthening exercise per patient tolerance.       Timed:  Manual Therapy:    0     mins  70331;  Therapeutic Exercise:    30     mins  60528;     Neuromuscular Magno:   0    mins  72383;    Therapeutic Activity:     8     mins  68546;     Gait Trainin     mins  23139;     Aquatics                         0      mins  04029    Un-timed:  Mechanical Traction      0     mins  76398  Dry Needling     0     mins self-pay  Electrical Stimulation:    0     mins  68920 ( );      Timed Treatment:   38   mins   Total Treatment:     38   mins    Elkin France PT  Physical Therapist    Electronically signed 10/17/2022    [place] License: PT - [id number]

## 2022-10-20 ENCOUNTER — TREATMENT (OUTPATIENT)
Dept: PHYSICAL THERAPY | Facility: CLINIC | Age: 67
End: 2022-10-20

## 2022-10-20 DIAGNOSIS — M43.17 SPONDYLOLISTHESIS AT L5-S1 LEVEL: ICD-10-CM

## 2022-10-20 DIAGNOSIS — S32.041D: ICD-10-CM

## 2022-10-20 DIAGNOSIS — M25.60 STIFFNESS IN JOINT: ICD-10-CM

## 2022-10-20 DIAGNOSIS — M47.816 SPONDYLOSIS OF LUMBAR REGION WITHOUT MYELOPATHY OR RADICULOPATHY: Primary | ICD-10-CM

## 2022-10-20 PROCEDURE — 97530 THERAPEUTIC ACTIVITIES: CPT | Performed by: PHYSICAL THERAPIST

## 2022-10-20 PROCEDURE — 97110 THERAPEUTIC EXERCISES: CPT | Performed by: PHYSICAL THERAPIST

## 2022-10-20 NOTE — PROGRESS NOTES
"Physical Therapy Daily Treatment Note  [place] [address] [place] [address]    Patient: Steve Roa   : 1955  Referring practitioner: Latesha Ivory*  Date of Initial Visit: Type: THERAPY  Noted: 10/10/2022  Today's Date: 10/20/2022  Patient seen for 4 sessions           Subjective  Steve Roa reports: pain rating 2/10 at arrival that \"ramped up to 3/10\" with side stepping.       Objective   See Exercise, Manual, and Modality Logs for complete treatment.       Assessment/Plan  Marcelo mitchell advancement in exercises today. Educated in postural awareness. Therapist directed program required to further address deficits outlined in evaluation.      Visit Diagnoses:    ICD-10-CM ICD-9-CM   1. Spondylosis of lumbar region without myelopathy or radiculopathy  M47.816 721.3   2. Stable burst fracture of fourth lumbar vertebra, subsequent encounter for fracture with routine healing  S32.041D V54.17   3. Spondylolisthesis at L5-S1 level  M43.17 756.12   4. Stiffness in joint  M25.60 719.50       Progress per Plan of Care and Progress strengthening /stabilization /functional activity           Timed:  Manual Therapy:         mins  70535;  Therapeutic Exercise:    18     mins  05983;     Neuromuscular Magno:        mins  81342;    Therapeutic Activity:     12     mins  32843;     Gait Training:           mins  69129;     Ultrasound:          mins  99389;    Electrical Stimulation:         mins  18451 ( );  Aquatics  __   mins   54629    Untimed:  Electrical Stimulation:         mins  08066 ( );  Mechanical Traction:         mins  03602;     Timed Treatment:   30   mins   Total Treatment:     30   mins    Electronically Signed:  Tisha Kahn PTA  Physical Therapist Assistant    [place] PTA license [id number]            "

## 2022-10-27 ENCOUNTER — TREATMENT (OUTPATIENT)
Dept: PHYSICAL THERAPY | Facility: CLINIC | Age: 67
End: 2022-10-27

## 2022-10-27 DIAGNOSIS — M43.17 SPONDYLOLISTHESIS AT L5-S1 LEVEL: ICD-10-CM

## 2022-10-27 DIAGNOSIS — S32.041D: ICD-10-CM

## 2022-10-27 DIAGNOSIS — M47.816 SPONDYLOSIS OF LUMBAR REGION WITHOUT MYELOPATHY OR RADICULOPATHY: Primary | ICD-10-CM

## 2022-10-27 DIAGNOSIS — M25.60 STIFFNESS IN JOINT: ICD-10-CM

## 2022-10-27 PROCEDURE — 97110 THERAPEUTIC EXERCISES: CPT | Performed by: PHYSICAL THERAPIST

## 2022-10-27 PROCEDURE — 97530 THERAPEUTIC ACTIVITIES: CPT | Performed by: PHYSICAL THERAPIST

## 2022-10-27 NOTE — PROGRESS NOTES
Physical Therapy Daily Treatment Note                      [place] [facility] [address] [facility] [place] [address]    Patient: Steve Roa   : 1955  Diagnosis/ICD-10 Code:  Spondylosis of lumbar region without myelopathy or radiculopathy [M47.816]  Referring practitioner: Latesha Ivory*  Date of Initial Visit: Type: THERAPY  Noted: 10/10/2022  Today's Date: 10/27/2022  Patient seen for 5 sessions           Subjective   The patient reported that his pain level today was a 1-2/10 prior to starting PT. His pain is increased some with exercise performance.    Objective   See Exercise, Manual, and Modality Logs for complete treatment.     Assessment/Plan  The patient demonstrated good tolerance to all functional hip/core strengthening exercise today. Continue to progress per patient tolerance.       Timed:  Manual Therapy:    0     mins  71600;  Therapeutic Exercise:    30     mins  42504;     Neuromuscular Magno:   0    mins  12456;    Therapeutic Activity:     8     mins  05010;     Gait Trainin     mins  64966;     Aquatics                         0      mins  94353    Un-timed:  Mechanical Traction      0     mins  22069  Dry Needling     0     mins self-pay  Electrical Stimulation:    0     mins  29928 ( );      Timed Treatment:   38   mins   Total Treatment:     38   mins    Elkin France PT  Physical Therapist    Electronically signed 10/27/2022    [place] License: PT - [id number]

## 2022-10-28 ENCOUNTER — TREATMENT (OUTPATIENT)
Dept: PHYSICAL THERAPY | Facility: CLINIC | Age: 67
End: 2022-10-28

## 2022-10-28 DIAGNOSIS — M25.60 STIFFNESS IN JOINT: ICD-10-CM

## 2022-10-28 DIAGNOSIS — M43.17 SPONDYLOLISTHESIS AT L5-S1 LEVEL: ICD-10-CM

## 2022-10-28 DIAGNOSIS — S32.041D: ICD-10-CM

## 2022-10-28 DIAGNOSIS — M47.816 SPONDYLOSIS OF LUMBAR REGION WITHOUT MYELOPATHY OR RADICULOPATHY: Primary | ICD-10-CM

## 2022-10-28 PROCEDURE — 97530 THERAPEUTIC ACTIVITIES: CPT | Performed by: PHYSICAL THERAPIST

## 2022-10-28 PROCEDURE — 97110 THERAPEUTIC EXERCISES: CPT | Performed by: PHYSICAL THERAPIST

## 2022-10-28 NOTE — PROGRESS NOTES
Physical Therapy Daily Treatment Note      Patient: Steve Roa   : 1955  Referring practitioner: Latesha Ivory*  Date of Initial Visit: Type: THERAPY  Noted: 10/10/2022  Today's Date: 10/28/2022  Patient seen for 6 sessions           Subjective Questionnaire:       Subjective Evaluation    History of Present Illness    Subjective comment: Pt reported 3/10 balck pain today.       Objective   See Exercise, Manual, and Modality Logs for complete treatment.       Assessment & Plan     Assessment    Assessment details: Pt reported since beginning  PT he is able to tolerate more daily activity.   Pt tolerated standing strengthening exercise with report of increased pain 4-5/10.  Pt will benefit from continued PT.        Visit Diagnoses:    ICD-10-CM ICD-9-CM   1. Spondylosis of lumbar region without myelopathy or radiculopathy  M47.816 721.3   2. Stable burst fracture of fourth lumbar vertebra, subsequent encounter for fracture with routine healing  S32.041D V54.17   3. Spondylolisthesis at L5-S1 level  M43.17 756.12   4. Stiffness in joint  M25.60 719.50       Progress per Plan of Care and Progress strengthening /stabilization /functional activity           Timed:  Manual Therapy:         mins  59309;  Therapeutic Exercise:    18     mins  60564;     Neuromuscular Magno:        mins  35237;    Therapeutic Activity:     8     mins  05986;     Gait Training:           mins  61300;     Ultrasound:          mins  94578;    Electrical Stimulation:         mins  04548 ( );  Aquatic Therapy          mins  30467    Untimed:  Electrical Stimulation:         mins  07783 ( );  Mechanical Traction:         mins  09101;     Timed Treatment:      mins   Total Treatment:        mins    Electronically signed    Jeimma Camarena PTA  Physical Therapist Assistant    [place] license: [id number]

## 2022-11-01 ENCOUNTER — TREATMENT (OUTPATIENT)
Dept: PHYSICAL THERAPY | Facility: CLINIC | Age: 67
End: 2022-11-01

## 2022-11-01 DIAGNOSIS — M47.816 SPONDYLOSIS OF LUMBAR REGION WITHOUT MYELOPATHY OR RADICULOPATHY: Primary | ICD-10-CM

## 2022-11-01 DIAGNOSIS — M25.60 STIFFNESS IN JOINT: ICD-10-CM

## 2022-11-01 DIAGNOSIS — M43.17 SPONDYLOLISTHESIS AT L5-S1 LEVEL: ICD-10-CM

## 2022-11-01 DIAGNOSIS — S32.041D: ICD-10-CM

## 2022-11-01 PROCEDURE — 97110 THERAPEUTIC EXERCISES: CPT | Performed by: PHYSICAL THERAPIST

## 2022-11-01 PROCEDURE — 97530 THERAPEUTIC ACTIVITIES: CPT | Performed by: PHYSICAL THERAPIST

## 2022-11-01 NOTE — PROGRESS NOTES
Physical Therapy Daily Treatment Note                      [place] [address] [address] [facility] [place] [address]    Patient: Steve Roa   : 1955  Diagnosis/ICD-10 Code:  Spondylosis of lumbar region without myelopathy or radiculopathy [M47.816]  Referring practitioner: Latesha Ivory*  Date of Initial Visit: Type: THERAPY  Noted: 10/10/2022  Today's Date: 2022  Patient seen for 7 sessions           Subjective   The patient reported no new complaints today. He rates his pain as a 2/10. He had a consultation with pain management and will be scheduled for injection.    Objective   See Exercise, Manual, and Modality Logs for complete treatment.     Assessment/Plan  The patient demonstrated good tolerance to all functional hip/core strengthening exercise today. Continue to progress per patient tolerance.        Timed:  Manual Therapy:    0     mins  29039;  Therapeutic Exercise:    30     mins  60528;     Neuromuscular Magno:   0    mins  66439;    Therapeutic Activity:     8     mins  74644;     Gait Trainin     mins  98323;     Aquatics                         0      mins  19582    Un-timed:  Mechanical Traction      0     mins  26635  Dry Needling     0     mins self-pay  Electrical Stimulation:    0     mins  57715 ( );      Timed Treatment:   38   mins   Total Treatment:     38   mins    Elkin France PT  Physical Therapist    Electronically signed 2022    [place] License: PT - [id number]

## 2022-11-02 ENCOUNTER — TREATMENT (OUTPATIENT)
Dept: PHYSICAL THERAPY | Facility: CLINIC | Age: 67
End: 2022-11-02

## 2022-11-02 DIAGNOSIS — M25.60 STIFFNESS IN JOINT: ICD-10-CM

## 2022-11-02 DIAGNOSIS — M43.17 SPONDYLOLISTHESIS AT L5-S1 LEVEL: ICD-10-CM

## 2022-11-02 DIAGNOSIS — S32.041D: ICD-10-CM

## 2022-11-02 DIAGNOSIS — M47.816 SPONDYLOSIS OF LUMBAR REGION WITHOUT MYELOPATHY OR RADICULOPATHY: Primary | ICD-10-CM

## 2022-11-02 PROCEDURE — 97530 THERAPEUTIC ACTIVITIES: CPT | Performed by: PHYSICAL THERAPIST

## 2022-11-02 PROCEDURE — 97110 THERAPEUTIC EXERCISES: CPT | Performed by: PHYSICAL THERAPIST

## 2022-11-02 NOTE — PROGRESS NOTES
Physical Therapy Daily Treatment Note                      [place] [facility] [address] [facility] [place] [address]    Patient: Steve Roa   : 1955  Diagnosis/ICD-10 Code:  Spondylosis of lumbar region without myelopathy or radiculopathy [M47.816]  Referring practitioner: Latesha Ivory*  Date of Initial Visit: Type: THERAPY  Noted: 10/10/2022  Today's Date: 2022  Patient seen for 8 sessions           Subjective   The patient reported that he wasn't too sore from his previous visit. He has no new complaints today.    Objective   See Exercise, Manual, and Modality Logs for complete treatment.     Assessment/Plan  The patient continues to demonstrate good tolerance to all functional lower extremity strengthening exercise. Continue with current PT plan of care.       Timed:  Manual Therapy:    0     mins  66032;  Therapeutic Exercise:    30     mins  19979;     Neuromuscular Magno:   0    mins  09879;    Therapeutic Activity:     8     mins  44707;     Gait Trainin     mins  20759;     Aquatics                         0      mins  29481    Un-timed:  Mechanical Traction      0     mins  68552  Dry Needling     0     mins self-pay  Electrical Stimulation:    0     mins  34026 ( );      Timed Treatment:   38   mins   Total Treatment:     38   mins    Elkin France PT  Physical Therapist    Electronically signed 2022    [place] License: PT - [id number]

## 2022-11-07 ENCOUNTER — TREATMENT (OUTPATIENT)
Dept: PHYSICAL THERAPY | Facility: CLINIC | Age: 67
End: 2022-11-07

## 2022-11-07 DIAGNOSIS — M25.60 STIFFNESS IN JOINT: ICD-10-CM

## 2022-11-07 DIAGNOSIS — M47.816 SPONDYLOSIS OF LUMBAR REGION WITHOUT MYELOPATHY OR RADICULOPATHY: Primary | ICD-10-CM

## 2022-11-07 DIAGNOSIS — S32.041D: ICD-10-CM

## 2022-11-07 DIAGNOSIS — M43.17 SPONDYLOLISTHESIS AT L5-S1 LEVEL: ICD-10-CM

## 2022-11-07 PROCEDURE — 97110 THERAPEUTIC EXERCISES: CPT | Performed by: PHYSICAL THERAPIST

## 2022-11-07 PROCEDURE — 97530 THERAPEUTIC ACTIVITIES: CPT | Performed by: PHYSICAL THERAPIST

## 2022-11-07 NOTE — PROGRESS NOTES
"Physical Therapy Daily Treatment Note  [place] [facility] [address] [place] [address]    Patient: Steve Roa   : 1955  Referring practitioner: Latesha Ivory*  Date of Initial Visit: Type: THERAPY  Noted: 10/10/2022  Today's Date: 2022  Patient seen for 9 sessions           Subjective  Steve Roa reports: he is not able to discern much improvement in pain. Pain at arrival /10, but can get up to 8-9/10 at its worst. \"I do feel like I have gotten a little stronger. I go  for epidural injections.\"       Objective   See Exercise, Manual, and Modality Logs for complete treatment.       Assessment/Plan   Utilized mirror to address correction of posture. Marcelo noted that he does shift to the L to avoid the pinch he feels R sided low back. He is progressing with gross strength, evident with increased resistance during some of his exercises today. Skilled care remains needed per POC.    Visit Diagnoses:    ICD-10-CM ICD-9-CM   1. Spondylosis of lumbar region without myelopathy or radiculopathy  M47.816 721.3   2. Stable burst fracture of fourth lumbar vertebra, subsequent encounter for fracture with routine healing  S32.041D V54.17   3. Spondylolisthesis at L5-S1 level  M43.17 756.12   4. Stiffness in joint  M25.60 719.50       Progress per Plan of Care and Progress strengthening /stabilization /functional activity           Timed:  Manual Therapy:         mins  02541;  Therapeutic Exercise:    25     mins  17528;     Neuromuscular Magno:        mins  23784;    Therapeutic Activity:     15     mins  81414;     Gait Training:           mins  26217;     Ultrasound:          mins  98877;    Electrical Stimulation:         mins  44274 ( );  Aquatics  __   mins   76475    Untimed:  Electrical Stimulation:         mins  75750 ( );  Mechanical Traction:         mins  57309;     Timed Treatment:   40   mins   Total Treatment:     40   mins    Electronically Signed:  Tisha" PRISCILLA Kahn  Physical Therapist Assistant    [place] PTA license [id number]

## 2022-11-09 ENCOUNTER — TREATMENT (OUTPATIENT)
Dept: PHYSICAL THERAPY | Facility: CLINIC | Age: 67
End: 2022-11-09

## 2022-11-09 DIAGNOSIS — M25.60 STIFFNESS IN JOINT: ICD-10-CM

## 2022-11-09 DIAGNOSIS — M47.816 SPONDYLOSIS OF LUMBAR REGION WITHOUT MYELOPATHY OR RADICULOPATHY: Primary | ICD-10-CM

## 2022-11-09 DIAGNOSIS — S32.041D: ICD-10-CM

## 2022-11-09 DIAGNOSIS — M43.17 SPONDYLOLISTHESIS AT L5-S1 LEVEL: ICD-10-CM

## 2022-11-09 PROCEDURE — 97110 THERAPEUTIC EXERCISES: CPT | Performed by: PHYSICAL THERAPIST

## 2022-11-09 PROCEDURE — 97530 THERAPEUTIC ACTIVITIES: CPT | Performed by: PHYSICAL THERAPIST

## 2022-11-09 NOTE — PROGRESS NOTES
Progress Note  [facility] [address] [place] [address]      Patient: Steve Roa   : 1955  Diagnosis/ICD-10 Code:  Spondylosis of lumbar region without myelopathy or radiculopathy [M47.816]  Referring practitioner: Latesha Ivory*  Date of Initial Visit: Type: THERAPY  Noted: 10/10/2022  Today's Date: 2022  Patient seen for 10 sessions      Subjective:   Subjective Questionnaire: Oswestry:  = 22% Disability  Clinical Progress: improved  Home Program Compliance: Yes  Treatment has included: therapeutic exercise, manual therapy and therapeutic activity    Subjective   The patient reported that his back pain is a 3/10 today. Since starting PT, he has noticed improvements in flexibility and strength, but no real change in pain. He goes for a round of injections next week and hopes that will help. He will decide after the injections if he wants to continue PT beyond next week.    Objective          Tenderness     Additional Tenderness Details  Tenderness to palpation in right sided lumbar paraspinals and illiopsoas    Neurological Testing     Additional Neurological Details  Sensation to light touch intact and equal bilaterally from L2-S1 except for hyposensation on left in L4 and L5 dermatome.    Seated slump: (-) bilaterally for increased sciatic neural tension.    Supine passive SLR: (-) bilaterally for increased sciatic neural tension.    Active Range of Motion     Lumbar   Flexion: 70 degrees   Extension: 10 degrees with pain  Left lateral flexion: Active left lumbar lateral flexion: 2'' above knee joint line, pain on right.   Right lateral flexion: Active right lumbar lateral flexion: 2'' above knee joint line.   Left rotation: Active left lumbar rotation: 60%, pain on right. with pain  Right rotation: Active right lumbar rotation: 75%     Strength/Myotome Testing     Left Hip   Planes of Motion   Flexion: 4-  Extension: 4-  Abduction: 4    Right Hip   Planes of  Motion   Flexion: 4  Extension: 4-  Abduction: 4-    Left Knee   Flexion: 4  Extension: 5    Right Knee   Flexion: 4+  Extension: 5    Left Ankle/Foot   Dorsiflexion: 5    Right Ankle/Foot   Dorsiflexion: 5    Ambulation     Ambulation: Level Surfaces     Additional Level Surfaces Ambulation Details  The patient ambulates with equal step length bilaterally, but with minimal trunk movement and slight right lateral shift of trunk.      See Exercise, Manual, and Modality Logs for complete treatment.     Assessment & Plan     Assessment    Assessment details: The patient was re-evaluated today and presents with improvements in lumbar flexibility, hip strength, and function (MALLORY). He is still slightly limited in lumbar flexibility, hip strength, and function (MALLORY). He goes for injections next week and will determine how he wants to proceed with PT after that visit. He would benefit from continued skilled physical therapy to address remaining functional deficits and to allow the patient to return to his prior level of function.    Goals  Plan Goals: LOW BACK PROBLEMS:    1. The patient complains of low back pain.  LTG 1: 12 weeks:  The patient will report a pain rating of 1/10 or better in order to improve  tolerance to activities of daily living and improve sleep quality.  STATUS:  Progressing  STG 1a: 6 weeks:  The patient will report a pain rating of 2/10 or better.  STATUS:  Progressing  TREATMENT:  Therapeutic exercises, manual therapy, aquatic therapy, home exercise   instruction, and modalities as needed for pain to include:  electrical stimulation, moist heat, ice,   ultrasound, and diathermy.      2. The patient demonstrates weakness of the bilateral hips.  LTG 2: 12 weeks:  The patient will demonstrate 4+ /5 strength for bilateral hip flexion, abduction,  and extension in order to improve hip stability.  STATUS:  Progressing  STG 2a: 6 weeks:  The patient will demonstrate 4 /5 strength for bilateral hip flexion,  abduction,  and extension.  STATUS:  Met  TREATMENT: Therapeutic exercises, manual therapy, aquatic therapy, home exercise instruction,  and modalities as needed for pain to include:  electrical stimulation, moist heat, ice, ultrasound, and   diathermy.      3. The patient has gait dysfunction.  LTG 3: 12 weeks:  The patient will ambulate without assistive device, independently, for   community distances with normal biomechanics in order to improve mobility and allow   patient to perform activities such as grocery shopping with greater ease.  STATUS:  Progressing  TREATMENT: Gait training, aquatic therapy, therapeutic exercise, and home exercise instruction.    4. The patient has limited lumbar AROM  LTG 4: 12 weeks:  The patient will demonstrate lumbar AROM as follows: 70 of flexion and 10 degrees of extension.  STATUS:  Met  TREATMENT: Manual therapy, therapeutic exercise, home exercise instruction, and modalities as needed to include: moist heat, electrical stimulation, and ultrasound.      5. Mobility: Walking/Moving Around Functional Limitation                   LTG 5: 12 weeks:  The patient will demonstrate 10 % limitation by achieving a score of 5/50 on the MALLORY.  STATUS:  Progressing  STG 5 a: 6 weeks:  The patient will demonstrate 20 % limitation by achieving a score of 10/50 on the MALLORY.    STATUS:  Progressing  TREATMENT:  Manual therapy, therapeutic exercise, home exercise instruction, and modalities as needed to include: moist heat, electrical stimulation, and ultrasound.      Progress toward previous goals: Partially Met      Recommendations: Continue as planned  Timeframe: 1 month  Prognosis to achieve goals: good    PT Signature: Elkin France PT    Electronically signed 11/9/2022    [place] License: PT - [id number]       Timed:  Manual Therapy:    0     mins  24407;  Therapeutic Exercise:    30     mins  64192;     Neuromuscular Magno:    0    mins  38091;    Therapeutic Activity:     8     mins  34530;      Gait Trainin     mins  27144;     Aquatics                         0      mins  92081    Un-timed:  Mechanical Traction      0     mins  76787  Dry Needling     0     mins self-pay  Electrical Stimulation:    0     mins  61817 ( );    Timed Treatment:   38   mins   Total Treatment:     38   mins

## 2022-11-14 ENCOUNTER — TREATMENT (OUTPATIENT)
Dept: PHYSICAL THERAPY | Facility: CLINIC | Age: 67
End: 2022-11-14

## 2022-11-14 DIAGNOSIS — S32.041D: ICD-10-CM

## 2022-11-14 DIAGNOSIS — M43.17 SPONDYLOLISTHESIS AT L5-S1 LEVEL: ICD-10-CM

## 2022-11-14 DIAGNOSIS — M25.60 STIFFNESS IN JOINT: ICD-10-CM

## 2022-11-14 DIAGNOSIS — M47.816 SPONDYLOSIS OF LUMBAR REGION WITHOUT MYELOPATHY OR RADICULOPATHY: Primary | ICD-10-CM

## 2022-11-14 PROCEDURE — 97110 THERAPEUTIC EXERCISES: CPT | Performed by: PHYSICAL THERAPIST

## 2022-11-14 PROCEDURE — 97530 THERAPEUTIC ACTIVITIES: CPT | Performed by: PHYSICAL THERAPIST

## 2022-11-14 NOTE — PROGRESS NOTES
Physical Therapy Daily Treatment Note                      [facility] [address] [facility] [place] [address]    Patient: Steve Roa   : 1955  Diagnosis/ICD-10 Code:  Spondylosis of lumbar region without myelopathy or radiculopathy [M47.816]  Referring practitioner: Latesha Ivory*  Date of Initial Visit: Type: THERAPY  Noted: 10/10/2022  Today's Date: 2022  Patient seen for 11 sessions           Subjective   The patient reported no new complaints today. He goes in for the first round of injections on Thursday.    Objective   See Exercise, Manual, and Modality Logs for complete treatment.     Assessment/Plan  The patient demonstrated good tolerance to all physical therapy interventions. Continue to progress per patient tolerance.        Timed:  Manual Therapy:    0     mins  47783;  Therapeutic Exercise:    30     mins  49833;     Neuromuscular Magno:   0    mins  23451;    Therapeutic Activity:     8     mins  33200;     Gait Trainin     mins  28454;     Aquatics                         0      mins  76989    Un-timed:  Mechanical Traction      0     mins  48316  Dry Needling     0     mins self-pay  Electrical Stimulation:    0     mins  87918 ( );      Timed Treatment:   38   mins   Total Treatment:     38   mins    Elkin France PT  Physical Therapist    Electronically signed 2022    [place] License: PT - [id number]

## 2022-11-16 ENCOUNTER — TREATMENT (OUTPATIENT)
Dept: PHYSICAL THERAPY | Facility: CLINIC | Age: 67
End: 2022-11-16

## 2022-11-16 DIAGNOSIS — M47.816 SPONDYLOSIS OF LUMBAR REGION WITHOUT MYELOPATHY OR RADICULOPATHY: Primary | ICD-10-CM

## 2022-11-16 DIAGNOSIS — S32.041D: ICD-10-CM

## 2022-11-16 DIAGNOSIS — M25.60 STIFFNESS IN JOINT: ICD-10-CM

## 2022-11-16 DIAGNOSIS — M43.17 SPONDYLOLISTHESIS AT L5-S1 LEVEL: ICD-10-CM

## 2022-11-16 PROCEDURE — 97530 THERAPEUTIC ACTIVITIES: CPT | Performed by: PHYSICAL THERAPIST

## 2022-11-16 PROCEDURE — 97110 THERAPEUTIC EXERCISES: CPT | Performed by: PHYSICAL THERAPIST

## 2022-11-16 NOTE — PROGRESS NOTES
Physical Therapy Daily Treatment Note                      [facility] [address] [facility] [place] [address]    Patient: Steve Roa   : 1955  Diagnosis/ICD-10 Code:  Spondylosis of lumbar region without myelopathy or radiculopathy [M47.816]  Referring practitioner: Latesha Ivory*  Date of Initial Visit: Type: THERAPY  Noted: 10/10/2022  Today's Date: 2022  Patient seen for 12 sessions           Subjective   The patient reported that his pain level is a 1.5/10 prior to starting PT today. He goes in for injections later this week.    Objective   See Exercise, Manual, and Modality Logs for complete treatment.     Assessment/Plan  The patient demonstrated good tolerance to all functional hip/core strengthening exercise today. He will undergo lumbar injections later in the week and will call back to let us know if he wants to continue with PT.       Timed:  Manual Therapy:    0     mins  65056;  Therapeutic Exercise:    30     mins  32681;     Neuromuscular Mango:   0    mins  96803;    Therapeutic Activity:     8     mins  08265;     Gait Trainin     mins  01306;     Aquatics                         0      mins  42514    Un-timed:  Mechanical Traction      0     mins  60340  Dry Needling     0     mins self-pay  Electrical Stimulation:    0     mins  15224 ( );      Timed Treatment:   38   mins   Total Treatment:     38   mins    Elkin France PT  Physical Therapist    Electronically signed 2022    [place] License: PT - [id number]

## 2023-01-18 ENCOUNTER — LAB (OUTPATIENT)
Dept: LAB | Facility: HOSPITAL | Age: 68
End: 2023-01-18
Payer: MEDICARE

## 2023-01-18 ENCOUNTER — PROCEDURE VISIT (OUTPATIENT)
Dept: NEUROLOGY | Facility: CLINIC | Age: 68
End: 2023-01-18
Payer: MEDICARE

## 2023-01-18 VITALS
BODY MASS INDEX: 42.15 KG/M2 | SYSTOLIC BLOOD PRESSURE: 159 MMHG | WEIGHT: 284.6 LBS | DIASTOLIC BLOOD PRESSURE: 82 MMHG | HEART RATE: 68 BPM | HEIGHT: 69 IN

## 2023-01-18 DIAGNOSIS — R73.03 PREDIABETES: Primary | ICD-10-CM

## 2023-01-18 DIAGNOSIS — R73.03 PREDIABETES: ICD-10-CM

## 2023-01-18 DIAGNOSIS — G62.9 SENSORY NEUROPATHY: ICD-10-CM

## 2023-01-18 DIAGNOSIS — R20.2 PARESTHESIAS: ICD-10-CM

## 2023-01-18 PROCEDURE — 86200 CCP ANTIBODY: CPT

## 2023-01-18 PROCEDURE — 82784 ASSAY IGA/IGD/IGG/IGM EACH: CPT

## 2023-01-18 PROCEDURE — 84165 PROTEIN E-PHORESIS SERUM: CPT

## 2023-01-18 PROCEDURE — 83036 HEMOGLOBIN GLYCOSYLATED A1C: CPT

## 2023-01-18 PROCEDURE — 95910 NRV CNDJ TEST 7-8 STUDIES: CPT | Performed by: PSYCHIATRY & NEUROLOGY

## 2023-01-18 PROCEDURE — 85652 RBC SED RATE AUTOMATED: CPT

## 2023-01-18 PROCEDURE — 86038 ANTINUCLEAR ANTIBODIES: CPT

## 2023-01-18 PROCEDURE — 99203 OFFICE O/P NEW LOW 30 MIN: CPT | Performed by: PSYCHIATRY & NEUROLOGY

## 2023-01-18 PROCEDURE — 86334 IMMUNOFIX E-PHORESIS SERUM: CPT

## 2023-01-18 PROCEDURE — 86431 RHEUMATOID FACTOR QUANT: CPT

## 2023-01-18 PROCEDURE — 84155 ASSAY OF PROTEIN SERUM: CPT

## 2023-01-18 PROCEDURE — 82607 VITAMIN B-12: CPT

## 2023-01-18 PROCEDURE — 82746 ASSAY OF FOLIC ACID SERUM: CPT

## 2023-01-18 PROCEDURE — 95886 MUSC TEST DONE W/N TEST COMP: CPT | Performed by: PSYCHIATRY & NEUROLOGY

## 2023-01-18 PROCEDURE — 36415 COLL VENOUS BLD VENIPUNCTURE: CPT

## 2023-01-18 NOTE — PROGRESS NOTES
"Chief Complaint  Numbness (NUMBNESS IN BILATERAL FEET, L>R)    Subjective          Steve Roa is a 67 y.o. male who presents to [facility]  History of Present Illness  67-year-old man evaluated for nerve conduction EMG study.  He has had numbness in his feet for 5 years.  It is to his ankle.  He thinks that it is related to his back.  He had an MRI of the lumbar spine showing disc disease at L4-L5.  The numbness is his entire foot from the ankle below.  He feels numb as well as cold.  It feels different when he walks on it.  He has noticed numbness above his ankles.    Reviewed the MRI of the lumbar spine as well as the labs.  He has elevated glucose.  Objective   Vital Signs:   /82 (BP Location: Left arm, Patient Position: Sitting, Cuff Size: Adult)   Pulse 68   Ht 175.3 cm (69.02\")   Wt 129 kg (284 lb 9.6 oz)   BMI 42.01 kg/m²     Physical Exam   Alert, fluent, phasic, follows commands well.  Reflexes are decreased to absen patellar's and absent ankles.  Sensation is decreased to pinprick above the ankle.  Vibration is impaired to the ankle.  There is no weakness on individual muscle testing.        Assessment and Plan  Diagnoses and all orders for this visit:    1. Prediabetes (Primary)  -     Hemoglobin A1c; Future    2. Paresthesias  -     EMG & Nerve Conduction Test    3. Sensory neuropathy  Assessment & Plan:  Nerve conduction studies abnormal shows electrophysiologic evidence for mild axonal and demyelinating sensory polyneuropathy.  EMG studies unremarkable and does not show electrophysiologic evidence for lumbosacral radiculopathy involving the L2-S1 ventral nerve roots on the left side which is more symptomatic.    I discussed with him that I will do a neuropathy work-up and he is to call our office and follow-up with his primary care provider.  I discussed with him that it is most likely diabetic neuropathy.    Orders:  -     Immunofixation " electrophoresis; Future  -     Sedimentation Rate; Future  -     JOSE FRANCISCO With / DsDNA, RNP, Sjogrens A / B, Ramos; Future  -     Rheumatoid Arthritis (RA) Profile; Future  -     Vitamin B12; Future  -     Folate; Future       Nerve Conduction Study:  7 nerves     EMG:  Complete    Total time spent with the patient and coordinating patient care was 30 minutes.    Follow Up  No follow-ups on file.  Patient was given instructions and counseling regarding his condition or for health maintenance advice. Please see specific information pulled into the AVS if appropriate.

## 2023-01-18 NOTE — ASSESSMENT & PLAN NOTE
Nerve conduction studies abnormal shows electrophysiologic evidence for mild axonal and demyelinating sensory polyneuropathy.  EMG studies unremarkable and does not show electrophysiologic evidence for lumbosacral radiculopathy involving the L2-S1 ventral nerve roots on the left side which is more symptomatic.    I discussed with him that I will do a neuropathy work-up and he is to call our office and follow-up with his primary care provider.  I discussed with him that it is most likely diabetic neuropathy.

## 2023-01-19 LAB
ERYTHROCYTE [SEDIMENTATION RATE] IN BLOOD: 14 MM/HR (ref 0–20)
FOLATE SERPL-MCNC: 8.78 NG/ML (ref 4.78–24.2)
HBA1C MFR BLD: 6.8 % (ref 4.8–5.6)
VIT B12 BLD-MCNC: 813 PG/ML (ref 211–946)

## 2023-01-20 LAB
ALBUMIN SERPL ELPH-MCNC: 4 G/DL (ref 2.9–4.4)
ALBUMIN/GLOB SERPL: 1.3 {RATIO} (ref 0.7–1.7)
ALPHA1 GLOB SERPL ELPH-MCNC: 0.3 G/DL (ref 0–0.4)
ALPHA2 GLOB SERPL ELPH-MCNC: 0.8 G/DL (ref 0.4–1)
ANA SER QL: NEGATIVE
B-GLOBULIN SERPL ELPH-MCNC: 1.1 G/DL (ref 0.7–1.3)
CCP IGA+IGG SERPL IA-ACNC: 8 UNITS (ref 0–19)
GAMMA GLOB SERPL ELPH-MCNC: 1 G/DL (ref 0.4–1.8)
GLOBULIN SER-MCNC: 3.2 G/DL (ref 2.2–3.9)
IGA SERPL-MCNC: 179 MG/DL (ref 61–437)
IGG SERPL-MCNC: 1147 MG/DL (ref 603–1613)
IGM SERPL-MCNC: 57 MG/DL (ref 20–172)
INTERPRETATION SERPL IEP-IMP: NORMAL
LABORATORY COMMENT REPORT: NORMAL
M PROTEIN SERPL ELPH-MCNC: NORMAL G/DL
PROT SERPL-MCNC: 7.2 G/DL (ref 6–8.5)
RHEUMATOID FACT SERPL-ACNC: <10 IU/ML

## 2023-01-23 ENCOUNTER — TELEPHONE (OUTPATIENT)
Dept: NEUROSURGERY | Facility: CLINIC | Age: 68
End: 2023-01-23
Payer: MEDICARE

## 2023-01-24 ENCOUNTER — TELEPHONE (OUTPATIENT)
Dept: NEUROSURGERY | Facility: CLINIC | Age: 68
End: 2023-01-24
Payer: MEDICARE

## 2024-09-24 ENCOUNTER — OFFICE VISIT (OUTPATIENT)
Dept: SLEEP MEDICINE | Facility: HOSPITAL | Age: 69
End: 2024-09-24
Payer: MEDICARE

## 2024-09-24 ENCOUNTER — TELEPHONE (OUTPATIENT)
Dept: SLEEP MEDICINE | Facility: HOSPITAL | Age: 69
End: 2024-09-24
Payer: MEDICARE

## 2024-09-24 VITALS
BODY MASS INDEX: 39.01 KG/M2 | SYSTOLIC BLOOD PRESSURE: 119 MMHG | WEIGHT: 263.4 LBS | OXYGEN SATURATION: 95 % | HEART RATE: 66 BPM | HEIGHT: 69 IN | DIASTOLIC BLOOD PRESSURE: 85 MMHG

## 2024-09-24 DIAGNOSIS — E66.9 OBESITY (BMI 30-39.9): ICD-10-CM

## 2024-09-24 DIAGNOSIS — I10 HYPERTENSION, UNSPECIFIED TYPE: ICD-10-CM

## 2024-09-24 DIAGNOSIS — G47.33 OSA (OBSTRUCTIVE SLEEP APNEA): Primary | ICD-10-CM

## 2024-09-24 DIAGNOSIS — G47.34 NOCTURNAL HYPOXEMIA: ICD-10-CM

## 2024-09-24 PROCEDURE — G0463 HOSPITAL OUTPT CLINIC VISIT: HCPCS

## 2024-09-24 RX ORDER — DULAGLUTIDE 4.5 MG/.5ML
4.5 INJECTION, SOLUTION SUBCUTANEOUS
COMMUNITY
Start: 2024-09-12

## 2024-10-10 ENCOUNTER — DOCUMENTATION (OUTPATIENT)
Dept: PHYSICAL THERAPY | Facility: CLINIC | Age: 69
End: 2024-10-10
Payer: MEDICARE

## 2024-10-10 NOTE — PROGRESS NOTES
Outpatient Physical Therapy  [address] [facility] [place], [place] [address]      Discharge Summary  Discharge Summary from Physical Therapy Report      Dates  PT visit: 10/10/22-11/16/22  Number of Visits: 12       Goals  Plan Goals: LOW BACK PROBLEMS:    1. The patient complains of low back pain.  LTG 1: 12 weeks:  The patient will report a pain rating of 1/10 or better in order to improve  tolerance to activities of daily living and improve sleep quality.  STATUS:  Progressing  STG 1a: 6 weeks:  The patient will report a pain rating of 2/10 or better.  STATUS:  Progressing  TREATMENT:  Therapeutic exercises, manual therapy, aquatic therapy, home exercise   instruction, and modalities as needed for pain to include:  electrical stimulation, moist heat, ice,   ultrasound, and diathermy.      2. The patient demonstrates weakness of the bilateral hips.  LTG 2: 12 weeks:  The patient will demonstrate 4+ /5 strength for bilateral hip flexion, abduction,  and extension in order to improve hip stability.  STATUS:  Progressing  STG 2a: 6 weeks:  The patient will demonstrate 4 /5 strength for bilateral hip flexion, abduction,  and extension.  STATUS:  Met  TREATMENT: Therapeutic exercises, manual therapy, aquatic therapy, home exercise instruction,  and modalities as needed for pain to include:  electrical stimulation, moist heat, ice, ultrasound, and   diathermy.      3. The patient has gait dysfunction.  LTG 3: 12 weeks:  The patient will ambulate without assistive device, independently, for   community distances with normal biomechanics in order to improve mobility and allow   patient to perform activities such as grocery shopping with greater ease.  STATUS:  Progressing  TREATMENT: Gait training, aquatic therapy, therapeutic exercise, and home exercise instruction.    4. The patient has limited lumbar AROM  LTG 4: 12 weeks:  The patient will demonstrate lumbar AROM as follows: 70 of flexion and 10 degrees of  extension.  STATUS:  Met  TREATMENT: Manual therapy, therapeutic exercise, home exercise instruction, and modalities as needed to include: moist heat, electrical stimulation, and ultrasound.      5. Mobility: Walking/Moving Around Functional Limitation                   LTG 5: 12 weeks:  The patient will demonstrate 10 % limitation by achieving a score of 5/50 on the MALLORY.  STATUS:  Progressing  STG 5 a: 6 weeks:  The patient will demonstrate 20 % limitation by achieving a score of 10/50 on the MALLORY.    STATUS:  Progressing  TREATMENT:  Manual therapy, therapeutic exercise, home exercise instruction, and modalities as needed to include: moist heat, electrical stimulation, and ultrasound.    Discharge Plan: Continue with current home exercise program as instructed    Date of Discharge 10/10/2024      Electronically signed:   Alannah Molina PTA  Physical Therapist Assistant  [facility] License #: [id number]

## 2024-10-17 ENCOUNTER — TELEPHONE (OUTPATIENT)
Dept: SLEEP MEDICINE | Facility: HOSPITAL | Age: 69
End: 2024-10-17
Payer: MEDICARE

## 2024-10-17 DIAGNOSIS — G47.33 OSA (OBSTRUCTIVE SLEEP APNEA): Primary | ICD-10-CM

## 2024-10-17 DIAGNOSIS — G47.34 NOCTURNAL HYPOXEMIA: ICD-10-CM

## 2024-10-17 NOTE — TELEPHONE ENCOUNTER
Message from [facility] that pt will need new sleep study or HST to get new CPAP machine.  Message sent to Dr. Celeste.

## 2025-01-09 ENCOUNTER — HOSPITAL ENCOUNTER (OUTPATIENT)
Dept: SLEEP MEDICINE | Facility: HOSPITAL | Age: 70
Discharge: HOME | End: 2025-01-09
Admitting: INTERNAL MEDICINE
Payer: MEDICARE

## 2025-01-09 DIAGNOSIS — G47.33 OSA (OBSTRUCTIVE SLEEP APNEA): ICD-10-CM

## 2025-01-09 DIAGNOSIS — G47.34 NOCTURNAL HYPOXEMIA: ICD-10-CM

## 2025-01-09 PROCEDURE — 95810 POLYSOM 6/> YRS 4/> PARAM: CPT

## 2025-01-17 DIAGNOSIS — G47.33 OSA (OBSTRUCTIVE SLEEP APNEA): Primary | ICD-10-CM

## 2025-01-17 DIAGNOSIS — G47.34 SLEEP RELATED HYPOXIA: ICD-10-CM

## 2025-01-21 ENCOUNTER — TELEPHONE (OUTPATIENT)
Dept: SLEEP MEDICINE | Facility: HOSPITAL | Age: 70
End: 2025-01-21
Payer: MEDICARE

## 2025-01-22 ENCOUNTER — TELEPHONE (OUTPATIENT)
Dept: SLEEP MEDICINE | Facility: HOSPITAL | Age: 70
End: 2025-01-22

## 2025-01-22 NOTE — TELEPHONE ENCOUNTER
Patient will use [facility]. Pt did not want to schedule appointment at this time-wants to wait about 6 weeks after he has gotten the machine.

## 2025-04-02 ENCOUNTER — OFFICE VISIT (OUTPATIENT)
Dept: ORTHOPEDIC SURGERY | Facility: CLINIC | Age: 70
End: 2025-04-02
Payer: MEDICARE

## 2025-04-02 VITALS
SYSTOLIC BLOOD PRESSURE: 132 MMHG | DIASTOLIC BLOOD PRESSURE: 81 MMHG | WEIGHT: 260 LBS | BODY MASS INDEX: 38.51 KG/M2 | OXYGEN SATURATION: 96 % | HEART RATE: 64 BPM | HEIGHT: 69 IN

## 2025-04-02 DIAGNOSIS — M25.561 RIGHT KNEE PAIN, UNSPECIFIED CHRONICITY: Primary | ICD-10-CM

## 2025-04-02 DIAGNOSIS — M17.11 PRIMARY OSTEOARTHRITIS OF RIGHT KNEE: ICD-10-CM

## 2025-04-02 RX ORDER — DULAGLUTIDE 4.5 MG/.5ML
4.5 INJECTION, SOLUTION SUBCUTANEOUS
COMMUNITY
Start: 2025-02-18

## 2025-04-02 NOTE — PROGRESS NOTES
Chief Complaint  Pain and Initial Evaluation of the Right Knee     Subjective      Steve Roa is a 69 y.o. male who presents to [facility] .    History of Present Illness  The patient presents for evaluation of right knee pain.  Patient has right knee osteoarthritis that has been managed intermittently with injection, the last of which being of Synvisc injection in May 2022.  He has a history of right knee arthroscopy with Dr. Milton in 2013.    He reports intermittent knee pain, which was particularly severe on the previous Sunday. The pain is localized to the medial aspect of the knee. He has not experienced any recent falls or injuries but recalls a fall from a barn approximately 5 to 6 years ago. He expresses a preference for a gel injection over a steroid injection today. He also mentions that his knee pain tends to flare up around the time he is scheduled for his back injections. He has been advised against excessive use of Excedrin due to potential interference with his injections. He has been receiving epidural injections for his back pain, which he finds beneficial. He has not taken any medication for his knee pain since Sunday when he used Excedrin.    MEDICATIONS  Excedrin      Allergies   Allergen Reactions    Diclofenac Unknown - Low Severity    Diclofenac Sodium Unknown - Low Severity     DOES NOT TOLERATE WELL        Social History     Socioeconomic History    Marital status:    Tobacco Use    Smoking status: Never    Smokeless tobacco: Never   Vaping Use    Vaping status: Never Used   Substance and Sexual Activity    Alcohol use: Yes     Comment: occ    Drug use: Never    Sexual activity: Defer        Tobacco Use: Low Risk  (4/2/2025)    Patient History     Smoking Tobacco Use: Never     Smokeless Tobacco Use: Never     Passive Exposure: Not on file     Patient reports that they are a nonsmoker; cessation education not applicable.     I reviewed the patient's  "chief complaint, history of present illness, review of systems, past medical history, surgical history, family history, social history, medications, and allergy list.     Review of Systems     Constitutional: Denies fevers, chills, weight loss  Cardiovascular: Denies chest pain, shortness of breath  Skin: Denies rashes, acute skin changes  Neurologic: Denies headache, loss of consciousness    Vital Signs:   /81   Pulse 64   Ht 175.3 cm (69\")   Wt 118 kg (260 lb)   SpO2 96%   BMI 38.40 kg/m²          Physical Exam  General: Alert. No acute distress    Ortho Exam      Physical Exam  The patient's knee flexion and extension range from 0 to 130 degrees. There is no tenderness to the joint lines.  Knee extensor mechanism intact with knee stable to varus and valgus stress.      Large Joint Arthrocentesis: R knee  Date/Time: 4/2/2025 1:33 PM  Consent given by: patient  Site marked: site marked  Timeout: Immediately prior to procedure a time out was called to verify the correct patient, procedure, equipment, support staff and site/side marked as required   Supporting Documentation  Indications: pain   Procedure Details  Location: knee - R knee  Preparation: Patient was prepped and draped in the usual sterile fashion  Needle gauge: 21 G.  Approach: lateral  Medications administered: 48 mg hylan 48 MG/6ML  Patient tolerance: patient tolerated the procedure well with no immediate complications        This injection documentation was Scribed for Hollie Bello PA-C by Mary Montanez.  04/02/25   13:34 EDT      Imaging Results (Most Recent)       Procedure Component Value Units Date/Time    XR Knee 3 View Right [[id number]] Resulted: 04/02/25 1317     Updated: 04/02/25 1317    Narrative:      X-Ray Report:  Study: X-rays ordered, taken in the office, and reviewed today  Indication: Right knee pain  View: AP/Lateral, Standing, and [place] view(s)  Findings: Moderate to severe degenerative changes of the medial and "   lateral compartments, mild patellofemoral OA.  Diffuse osteophytes noted   throughout all compartments.  Prior studies available for comparison: yes               Result Review :       XR Knee 3 View Right  Result Date: 4/2/2025  Narrative: X-Ray Report: Study: X-rays ordered, taken in the office, and reviewed today Indication: Right knee pain View: AP/Lateral, Standing, and [place] view(s) Findings: Moderate to severe degenerative changes of the medial and lateral compartments, mild patellofemoral OA.  Diffuse osteophytes noted throughout all compartments. Prior studies available for comparison: yes              Assessment and Plan     Diagnoses and all orders for this visit:    1. Right knee pain, unspecified chronicity (Primary)  -     XR Knee 3 View Right    2. Primary osteoarthritis of right knee    Other orders  -     Large Joint Arthrocentesis: R knee        Assessment & Plan  X-rays reveal bone spurs and significant arthritis, with bone-on-bone contact on the inside and nearing on the outside. A gel injection was administered today to alleviate the pain. He was informed that the gel might cause some tightness and discomfort initially, but it should improve over a couple of weeks. He was advised to apply ice to the area if needed and to avoid touching the injection site. No restrictions were placed on his activities. He is scheduled for a follow-up injection in 6 months and 1 day, as per insurance requirements. If the current injection provides lasting relief, he can contact the office to schedule the next injection when ready.    Patient received synvisc injection today in office in right knee and tolerated the procedure well without complication.  Counseled that these injections can be given every 6 months and 1 day with insurance approval. Pt can also receive steroid injections intermittently between these doses, with at least 6 weeks between the two.  Steroid injections can be given every 3  months.    Discussed the risk, benefits and alternatives of injection.  Discussed potential complications such as increased pain, swelling and tenderness at the injection site.  Possibility of reaction.  Possibility that injection may not improve pain. Risk of infection.  Possibility of worsening pain after injection.  Patient verbalized understanding and gives verbal consent to proceed.     Follow up 6 months 1 day for repeat gel injection.  Call with questions, concerns, or worsening symptoms.        Follow Up   There are no Patient Instructions on file for this visit.        Patient was given instructions and counseling regarding his condition or for health maintenance advice. Please see specific information pulled into the AVS if appropriate.     Hollie Bello PA-C   04/02/2025  12:49 EDT    Patient or patient representative verbalized consent for the use of Ambient Listening during the visit with  Hollie Bello PA-C for chart documentation. 4/2/2025  15:28 EDT    Dictated Utilizing Dragon Dictation. Please note that portions of this note were completed with a voice recognition program. Part of this note may be an electronic transcription/translation of spoken language to printed text using the Dragon Dictation System.

## 2025-08-27 ENCOUNTER — TELEPHONE (OUTPATIENT)
Dept: ORTHOPEDIC SURGERY | Facility: CLINIC | Age: 70
End: 2025-08-27
Payer: MEDICARE